# Patient Record
Sex: FEMALE | Race: OTHER | HISPANIC OR LATINO | Employment: STUDENT | URBAN - METROPOLITAN AREA
[De-identification: names, ages, dates, MRNs, and addresses within clinical notes are randomized per-mention and may not be internally consistent; named-entity substitution may affect disease eponyms.]

---

## 2024-06-21 ENCOUNTER — PATIENT ROUNDING (BHMG ONLY) (OUTPATIENT)
Dept: FAMILY MEDICINE CLINIC | Facility: CLINIC | Age: 19
End: 2024-06-21
Payer: MEDICAID

## 2024-06-21 ENCOUNTER — OFFICE VISIT (OUTPATIENT)
Dept: FAMILY MEDICINE CLINIC | Facility: CLINIC | Age: 19
End: 2024-06-21
Payer: MEDICAID

## 2024-06-21 VITALS
HEIGHT: 63 IN | OXYGEN SATURATION: 98 % | RESPIRATION RATE: 20 BRPM | SYSTOLIC BLOOD PRESSURE: 124 MMHG | BODY MASS INDEX: 46.67 KG/M2 | TEMPERATURE: 97.8 F | DIASTOLIC BLOOD PRESSURE: 84 MMHG | HEART RATE: 75 BPM | WEIGHT: 263.4 LBS

## 2024-06-21 DIAGNOSIS — E11.9 NEWLY DIAGNOSED DIABETES: ICD-10-CM

## 2024-06-21 DIAGNOSIS — Z13.1 DIABETES MELLITUS SCREENING: ICD-10-CM

## 2024-06-21 DIAGNOSIS — Z11.59 NEED FOR HEPATITIS C SCREENING TEST: ICD-10-CM

## 2024-06-21 DIAGNOSIS — E66.01 CLASS 3 SEVERE OBESITY WITHOUT SERIOUS COMORBIDITY WITH BODY MASS INDEX (BMI) OF 45.0 TO 49.9 IN ADULT, UNSPECIFIED OBESITY TYPE: ICD-10-CM

## 2024-06-21 DIAGNOSIS — Z86.79 HISTORY OF HYPERTENSION: ICD-10-CM

## 2024-06-21 DIAGNOSIS — Z00.00 ANNUAL PHYSICAL EXAM: Primary | ICD-10-CM

## 2024-06-21 NOTE — PROGRESS NOTES
How did you hear about our practice/providers?  MOM CALLED  Tell me about your visit with us. What things went well?    EVERYTHING     We're always looking for ways to make our patients' experiences even better. Do you have recommendations on ways we may improve?  NO    Overall were you satisfied with your first visit to our practice?  YES VERY SATISFIED     Is there anything else I can do for you?  Would you like for me to have my  you?  NO  You may receive a survey from [employer] via mail, text or email to provide feedback about your visit.  We ask that you please take a few minutes to complete the survey and let us know how we are doing.  If for any reason you feel unable to give us the highest rating please let me know.

## 2024-06-21 NOTE — PROGRESS NOTES
Subjective   Pam Robins is a 18 y.o. female who presents for routine physical      Chief Complaint   Patient presents with    Hypertension     Wants labs check.    Establish Care     Patient was told by urgent care provider if she has hypertension I would like some labs.     Menstrual History: LMP 6/6, irregular, has amenorrhea during school season only  Pregnancy History: G0  Sexual History: never active, not planing to be active   Contraception: none  Hormone Replacement Therapy: none  Diet: fast food for lunch, home cooking for dinner.  Exercise: no  Do you feel safe? yes  Have you ever been abused? no    Mammogram: no, no family hx of breast cancer  Pap Smear: none  Bone Density: none  Colon Cancer Screening: none, no fhx of colon cacner    Immunization History   Administered Date(s) Administered    COVID-19 (PFIZER) Purple Cap Monovalent 10/28/2021, 11/17/2021    DTaP 02/18/2004, 04/26/2004, 06/29/2004, 07/27/2005, 12/27/2007    FluMist 2-49yrs 10/31/2013, 11/17/2014    Fluzone (or Fluarix & Flulaval for VFC) >6mos 11/09/2015, 12/09/2016, 09/14/2017, 01/16/2019, 11/16/2019, 10/01/2020, 10/21/2021    HPV Quadrivalent 04/14/2015, 07/29/2015    Hep A, 2 Dose 08/15/2007, 02/21/2011    Hep B, Adolescent or Pediatric 2003, 02/18/2004, 09/29/2004    Hib (PRP-OMP) 02/18/2004, 04/26/2004, 06/29/2004, 04/20/2005    Hpv9 01/18/2016    IPV 02/18/2004, 04/26/2004, 07/27/2005, 12/27/2007    Influenza LAIV (Nasal) 02/21/2011    Influenza Seasonal Injectable 12/06/2007    MMR 04/20/2005, 12/27/2007    Meningococcal B,(Bexsero) 08/25/2020, 08/30/2021    Meningococcal MCV4P (Menactra) 04/14/2015, 08/25/2020    Pneumococcal Conjugate 13-Valent (PCV13) 02/18/2004, 04/26/2004, 06/29/2004, 01/11/2005    Tdap 04/14/2015    Varicella 01/11/2005, 12/27/2007       The following portions of the patient's history were reviewed and updated as appropriate: allergies, current medications, past family history, past medical history,  past social history, past surgical history and problem list.    Past Medical History:   Diagnosis Date    Allergic 2017    Pollen    Amenorrhea        History reviewed. No pertinent surgical history.    Family History   Problem Relation Age of Onset    Thyroid disease Mother     DAVE disease Father     No Known Problems Sister     No Known Problems Brother     Heart disease Maternal Grandmother     Hyperlipidemia Maternal Grandmother     Asthma Paternal Grandmother     Cancer Paternal Grandmother         Lung Cancer    Diabetes Paternal Grandmother     Cancer Paternal Aunt         Uterine Cancer       Social History     Socioeconomic History    Marital status: Single   Tobacco Use    Smoking status: Never     Passive exposure: Never    Smokeless tobacco: Never   Vaping Use    Vaping status: Never Used   Substance and Sexual Activity    Alcohol use: Not Currently     Alcohol/week: 1.0 standard drink of alcohol     Types: 1 Drinks containing 0.5 oz of alcohol per week     Comment: It's once in a while, not every week.    Drug use: Never    Sexual activity: Never       Review of Systems   Constitutional:  Negative for activity change, appetite change, chills, fatigue, fever and unexpected weight change.   HENT:  Positive for ear pain. Negative for ear discharge, rhinorrhea and sore throat.    Respiratory:  Negative for cough and chest tightness.    Cardiovascular:  Negative for chest pain, palpitations and leg swelling.   Gastrointestinal:  Negative for abdominal pain, constipation, diarrhea, nausea and vomiting.   Endocrine: Negative for polydipsia and polyuria.   Genitourinary:  Positive for menstrual problem. Negative for dysuria and vaginal discharge.   Neurological:  Negative for dizziness, seizures, weakness, light-headedness, numbness and headaches.   Psychiatric/Behavioral:  Negative for dysphoric mood and suicidal ideas. The patient is not nervous/anxious.        Objective   Vitals:    06/21/24 1048   BP:  124/84   Pulse: 75   Resp: 20   Temp: 97.8 °F (36.6 °C)   SpO2: 98%     Body mass index is 46.66 kg/m².  Physical Exam  Constitutional:       General: She is not in acute distress.     Appearance: She is obese. She is not ill-appearing.   HENT:      Head: Normocephalic and atraumatic.      Right Ear: Tympanic membrane, ear canal and external ear normal.      Left Ear: Tympanic membrane, ear canal and external ear normal.      Nose: Nose normal.      Mouth/Throat:      Mouth: Mucous membranes are moist.      Pharynx: No oropharyngeal exudate or posterior oropharyngeal erythema.   Cardiovascular:      Rate and Rhythm: Normal rate and regular rhythm.      Pulses: Normal pulses.      Heart sounds: No murmur heard.  Pulmonary:      Effort: Pulmonary effort is normal. No respiratory distress.      Breath sounds: Normal breath sounds.   Abdominal:      General: Abdomen is protuberant.      Palpations: Abdomen is soft. There is no hepatomegaly or splenomegaly.      Tenderness: There is no abdominal tenderness.   Musculoskeletal:      Right lower leg: No edema.      Left lower leg: No edema.   Lymphadenopathy:      Cervical: No cervical adenopathy.   Skin:     Capillary Refill: Capillary refill takes less than 2 seconds.      Findings: No lesion.   Neurological:      Mental Status: She is alert.   Psychiatric:         Mood and Affect: Mood normal.         Behavior: Behavior normal.           Assessment & Plan   Diagnoses and all orders for this visit:    1. Annual physical exam (Primary)  -     Lipid Panel  -     CBC & Differential  -     Comprehensive Metabolic Panel  -     Hemoglobin A1c  -     TSH Rfx On Abnormal To Free T4  -     Hepatitis C Antibody  -     Vitamin D 25 hydroxy  -     Urinalysis With Culture If Indicated -    2. Class 3 severe obesity without serious comorbidity with body mass index (BMI) of 45.0 to 49.9 in adult, unspecified obesity type  -     Ambulatory Referral to Nutrition Services  -     Lipid  Panel  -     CBC & Differential  -     Hemoglobin A1c  -     TSH Rfx On Abnormal To Free T4    3. Need for hepatitis C screening test  -     Hepatitis C Antibody    4. Diabetes mellitus screening  -     Hemoglobin A1c    5. History of hypertension    Other orders  -     Microscopic Examination -        Annual physical  Overall patient is doing well  Check routine labs  Discussed the importance of maintaining a healthy weight and getting regular exercise.  Educated patient on the benefits of healthy diet.  Advise follow-up annually for wellness exams.    History of hypertension/overweight  Reviewed previous labs highest /90 during urgent care visit  I do not think patient has hypertension, BP today 124/84  Advised patient to lose weight and referred her to nutritionist which will help with blood pressure management.  Follow-up in 6 months.      Patient Instructions   Goal //60

## 2024-06-22 LAB
25(OH)D3+25(OH)D2 SERPL-MCNC: 15.7 NG/ML (ref 30–100)
ALBUMIN SERPL-MCNC: 4.4 G/DL (ref 4–5)
ALP SERPL-CCNC: 67 IU/L (ref 42–106)
ALT SERPL-CCNC: 37 IU/L (ref 0–32)
APPEARANCE UR: CLEAR
AST SERPL-CCNC: 24 IU/L (ref 0–40)
BACTERIA #/AREA URNS HPF: NORMAL /[HPF]
BASOPHILS # BLD AUTO: 0 X10E3/UL (ref 0–0.2)
BASOPHILS NFR BLD AUTO: 0 %
BILIRUB SERPL-MCNC: 0.3 MG/DL (ref 0–1.2)
BILIRUB UR QL STRIP: NEGATIVE
BUN SERPL-MCNC: 13 MG/DL (ref 6–20)
BUN/CREAT SERPL: 22 (ref 9–23)
CALCIUM SERPL-MCNC: 9.5 MG/DL (ref 8.7–10.2)
CASTS URNS QL MICRO: NORMAL /LPF
CHLORIDE SERPL-SCNC: 103 MMOL/L (ref 96–106)
CHOLEST SERPL-MCNC: 191 MG/DL (ref 100–199)
CO2 SERPL-SCNC: 21 MMOL/L (ref 20–29)
COLOR UR: YELLOW
CREAT SERPL-MCNC: 0.59 MG/DL (ref 0.57–1)
EGFRCR SERPLBLD CKD-EPI 2021: 132 ML/MIN/1.73
EOSINOPHIL # BLD AUTO: 0.1 X10E3/UL (ref 0–0.4)
EOSINOPHIL NFR BLD AUTO: 1 %
EPI CELLS #/AREA URNS HPF: NORMAL /HPF (ref 0–10)
ERYTHROCYTE [DISTWIDTH] IN BLOOD BY AUTOMATED COUNT: 12.2 % (ref 11.7–15.4)
GLOBULIN SER CALC-MCNC: 2.5 G/DL (ref 1.5–4.5)
GLUCOSE SERPL-MCNC: 136 MG/DL (ref 70–99)
GLUCOSE UR QL STRIP: NEGATIVE
HBA1C MFR BLD: 7.5 % (ref 4.8–5.6)
HCT VFR BLD AUTO: 38.8 % (ref 34–46.6)
HCV IGG SERPL QL IA: NON REACTIVE
HDLC SERPL-MCNC: 37 MG/DL
HGB BLD-MCNC: 12.7 G/DL (ref 11.1–15.9)
HGB UR QL STRIP: NEGATIVE
IMM GRANULOCYTES # BLD AUTO: 0 X10E3/UL (ref 0–0.1)
IMM GRANULOCYTES NFR BLD AUTO: 0 %
KETONES UR QL STRIP: NEGATIVE
LDLC SERPL CALC-MCNC: 126 MG/DL (ref 0–99)
LEUKOCYTE ESTERASE UR QL STRIP: NEGATIVE
LYMPHOCYTES # BLD AUTO: 2.3 X10E3/UL (ref 0.7–3.1)
LYMPHOCYTES NFR BLD AUTO: 33 %
MCH RBC QN AUTO: 28.7 PG (ref 26.6–33)
MCHC RBC AUTO-ENTMCNC: 32.7 G/DL (ref 31.5–35.7)
MCV RBC AUTO: 88 FL (ref 79–97)
MICRO URNS: NORMAL
MICRO URNS: NORMAL
MONOCYTES # BLD AUTO: 0.5 X10E3/UL (ref 0.1–0.9)
MONOCYTES NFR BLD AUTO: 7 %
NEUTROPHILS # BLD AUTO: 4 X10E3/UL (ref 1.4–7)
NEUTROPHILS NFR BLD AUTO: 59 %
NITRITE UR QL STRIP: NEGATIVE
PH UR STRIP: 6.5 [PH] (ref 5–7.5)
PLATELET # BLD AUTO: 286 X10E3/UL (ref 150–450)
POTASSIUM SERPL-SCNC: 4.3 MMOL/L (ref 3.5–5.2)
PROT SERPL-MCNC: 6.9 G/DL (ref 6–8.5)
PROT UR QL STRIP: NEGATIVE
RBC # BLD AUTO: 4.42 X10E6/UL (ref 3.77–5.28)
RBC #/AREA URNS HPF: NORMAL /HPF (ref 0–2)
SODIUM SERPL-SCNC: 137 MMOL/L (ref 134–144)
SP GR UR STRIP: 1.02 (ref 1–1.03)
TRIGL SERPL-MCNC: 155 MG/DL (ref 0–149)
TSH SERPL DL<=0.005 MIU/L-ACNC: 2.92 UIU/ML (ref 0.45–4.5)
URINALYSIS REFLEX: NORMAL
UROBILINOGEN UR STRIP-MCNC: 0.2 MG/DL (ref 0.2–1)
VLDLC SERPL CALC-MCNC: 28 MG/DL (ref 5–40)
WBC # BLD AUTO: 7 X10E3/UL (ref 3.4–10.8)
WBC #/AREA URNS HPF: NORMAL /HPF (ref 0–5)

## 2024-06-26 ENCOUNTER — PATIENT ROUNDING (BHMG ONLY) (OUTPATIENT)
Dept: FAMILY MEDICINE CLINIC | Facility: CLINIC | Age: 19
End: 2024-06-26
Payer: MEDICAID

## 2024-06-26 NOTE — PROGRESS NOTES
Sent Patient following in [employer] Message:  My name is Antwon Raza      I am the Practice Manager with   [facility] [place]  [address]  [facility] [address] [phone].    And    [facility] [address],  [place], [place] [id number] [phone]    I am messaging to officially welcome you to our practice and ask about your recent visit.     How did you hear about our practice/providers?    Tell me about your visit with us. What things went well?         We're always looking for ways to make our patients' experiences even better. Do you have recommendations on ways we may improve?       Overall were you satisfied with your first visit to our practice?        Is there anything else I can do for you?     You may receive a survey from [employer] via text or email to provide feedback about your visit.  We ask that you please take a few minutes to complete the survey and let us know how we are doing.  If for any reason you feel unable to give us the highest rating please let me know.      Thank you, and have a great day.

## 2024-07-01 LAB
GAD65 AB SER IA-ACNC: <5 U/ML (ref 0–5)
PANC ISLET CELL AB TITR SER: NEGATIVE {TITER}
WRITTEN AUTHORIZATION: NORMAL

## 2024-07-08 ENCOUNTER — HOSPITAL ENCOUNTER (OUTPATIENT)
Dept: DIABETES SERVICES | Facility: HOSPITAL | Age: 19
Discharge: HOME | End: 2024-07-08
Admitting: FAMILY MEDICINE
Payer: MEDICAID

## 2024-07-08 PROCEDURE — 97802 MEDICAL NUTRITION INDIV IN: CPT

## 2024-07-08 NOTE — CONSULTS
Pam met with ANGÉLICA LOGAN for Diabetes Education.  A comprehensive assessment/training record has been sent to medical records (see media tab) to associate with this encounter.     A1c discussed. ADA goals reviewed. Thinks her grandma on dad side had diabetes. Was prescribed ozempic- awaiting insurance.     Student at [employer]. Encouraged to look up nutrition information on the dining services website or utilize the on campus dietitian. Was going to gym 2-3 times a week. Encouraged 150 minutes per week.     Consistent carbohydrate diet/carbohydrate counting discussed. Encouraged balancing carbs with lean proteins/limiting saturated fats. Encouraged limiting sugar sweetened beverages. Labels, portions, meal planning reviewed.      Pam has been encouraged to call our office with questions or additional education needs. Please place referral for additional services or follow-up should need arise.     Thank you for the referral.

## 2024-07-09 ENCOUNTER — TELEPHONE (OUTPATIENT)
Dept: FAMILY MEDICINE CLINIC | Facility: CLINIC | Age: 19
End: 2024-07-09
Payer: MEDICAID

## 2024-07-09 NOTE — TELEPHONE ENCOUNTER
OZEMPIC PA APPROVED  The request has been approved. The authorization is effective from 07/08/2024 to 01/08/2025, as long as the member is enrolled in their current health plan. The request was approved as submitted. A written notification letter will follow with additional details.. Authorization Expiration Date: January 8, 2025.  Patient and pharmacy notified.

## 2024-07-10 ENCOUNTER — CLINICAL SUPPORT (OUTPATIENT)
Dept: FAMILY MEDICINE CLINIC | Facility: CLINIC | Age: 19
End: 2024-07-10
Payer: MEDICAID

## 2024-09-06 ENCOUNTER — TELEPHONE (OUTPATIENT)
Dept: FAMILY MEDICINE CLINIC | Facility: CLINIC | Age: 19
End: 2024-09-06
Payer: MEDICAID

## 2024-09-06 DIAGNOSIS — E11.65 TYPE 2 DIABETES MELLITUS WITH HYPERGLYCEMIA, WITHOUT LONG-TERM CURRENT USE OF INSULIN: ICD-10-CM

## 2024-09-06 NOTE — TELEPHONE ENCOUNTER
Caller: Pam Robins    Relationship: Self    Best call back number: [phone]     What medication are you requesting: Semaglutide, 1 MG/DOSE, (OZEMPIC) 4 MG/3ML solution pen-injector     If a prescription is needed, what is your preferred pharmacy and phone number: [facility] [address] [place] [address] [address] [facility] - [phone]  - [phone]     Additional notes: PATIENT STATES THAT SHE NEEDS TO CHANGE PHARMACIES. REQUESTS NEW PRESCRIPTION TO GO TO [facility]

## 2024-10-07 ENCOUNTER — OFFICE VISIT (OUTPATIENT)
Dept: FAMILY MEDICINE CLINIC | Facility: CLINIC | Age: 19
End: 2024-10-07
Payer: MEDICAID

## 2024-10-07 VITALS
BODY MASS INDEX: 45.64 KG/M2 | WEIGHT: 257.6 LBS | HEART RATE: 63 BPM | RESPIRATION RATE: 16 BRPM | SYSTOLIC BLOOD PRESSURE: 114 MMHG | DIASTOLIC BLOOD PRESSURE: 82 MMHG | TEMPERATURE: 98.2 F | OXYGEN SATURATION: 98 % | HEIGHT: 63 IN

## 2024-10-07 DIAGNOSIS — Z23 IMMUNIZATION DUE: ICD-10-CM

## 2024-10-07 DIAGNOSIS — E11.65 TYPE 2 DIABETES MELLITUS WITH HYPERGLYCEMIA, WITHOUT LONG-TERM CURRENT USE OF INSULIN: Primary | ICD-10-CM

## 2024-10-07 DIAGNOSIS — E55.9 VITAMIN D DEFICIENCY: ICD-10-CM

## 2024-10-07 LAB
25(OH)D3+25(OH)D2 SERPL-MCNC: 30.9 NG/ML (ref 30–100)
ALBUMIN SERPL-MCNC: 4.5 G/DL (ref 3.5–5.2)
ALBUMIN/GLOB SERPL: 1.9 G/DL
ALP SERPL-CCNC: 67 U/L (ref 39–117)
ALT SERPL-CCNC: 32 U/L (ref 1–33)
AST SERPL-CCNC: 21 U/L (ref 1–32)
BILIRUB SERPL-MCNC: 0.3 MG/DL (ref 0–1.2)
BUN SERPL-MCNC: 15 MG/DL (ref 6–20)
BUN/CREAT SERPL: 22.4 (ref 7–25)
CALCIUM SERPL-MCNC: 9.5 MG/DL (ref 8.6–10.5)
CHLORIDE SERPL-SCNC: 104 MMOL/L (ref 98–107)
CO2 SERPL-SCNC: 25.5 MMOL/L (ref 22–29)
CREAT SERPL-MCNC: 0.67 MG/DL (ref 0.57–1)
EGFRCR SERPLBLD CKD-EPI 2021: 128.5 ML/MIN/1.73
GLOBULIN SER CALC-MCNC: 2.4 GM/DL
GLUCOSE SERPL-MCNC: 84 MG/DL (ref 65–99)
HBA1C MFR BLD: 5.7 % (ref 4.8–5.6)
POTASSIUM SERPL-SCNC: 4.3 MMOL/L (ref 3.5–5.2)
PROT SERPL-MCNC: 6.9 G/DL (ref 6–8.5)
SODIUM SERPL-SCNC: 140 MMOL/L (ref 136–145)

## 2024-10-07 PROCEDURE — 90472 IMMUNIZATION ADMIN EACH ADD: CPT | Performed by: FAMILY MEDICINE

## 2024-10-07 PROCEDURE — 90471 IMMUNIZATION ADMIN: CPT | Performed by: FAMILY MEDICINE

## 2024-10-07 PROCEDURE — 90656 IIV3 VACC NO PRSV 0.5 ML IM: CPT | Performed by: FAMILY MEDICINE

## 2024-10-07 PROCEDURE — 90677 PCV20 VACCINE IM: CPT | Performed by: FAMILY MEDICINE

## 2024-10-07 PROCEDURE — 99214 OFFICE O/P EST MOD 30 MIN: CPT | Performed by: FAMILY MEDICINE

## 2024-10-07 PROCEDURE — 3051F HG A1C>EQUAL 7.0%<8.0%: CPT | Performed by: FAMILY MEDICINE

## 2024-10-07 NOTE — PROGRESS NOTES
Chief Complaint  Follow-up and Diabetes    Subjective         aPm Robins presents to [facility]  History of Present Illness  18-year-old female presents today for routine follow-up on diabetes.  and vitamin D deficiency    Labs 6/21/2024, A1c 7.5, BREANN 605, + antibody negative.      Patient's was started on Ozempic, currently on 1 mg, patient denies side effects, she denies abdominal pain, N/C/D, diarrhea or constipation.  Patient denies hypoglycemia symptoms.  Need repeat labs    Patient is on vitamin D weekly 50,000 units, needs repeat labs.    Objective     Review of Systems     Past Medical History:   Diagnosis Date    Allergic 2017    Pollen    Amenorrhea     Diabetes mellitus June, 2024        Current Outpatient Medications:     Allergy Relief Cetirizine 10 MG tablet, Take 1 tablet by mouth Daily., Disp: , Rfl:     diphenhydrAMINE (BENADRYL) 25 mg capsule, TAKE ONE TABLET BY MOUTH EVERY 4 TO 6 HOURS AS NEEDED, Disp: , Rfl:     EPINEPHrine (EPIPEN) 0.3 MG/0.3ML solution auto-injector injection, use as directed in case of allergic reaction, Disp: , Rfl:     fluticasone (FLONASE) 50 MCG/ACT nasal spray, 2 sprays into the nostril(s) as directed by provider Daily., Disp: 18.2 mL, Rfl: 0    montelukast (SINGULAIR) 10 MG tablet, Take 1 tablet by mouth every night at bedtime., Disp: , Rfl:     vitamin D (ERGOCALCIFEROL) 1.25 MG (16635 UT) capsule capsule, Take 1 capsule by mouth 1 (One) Time Per Week., Disp: 12 capsule, Rfl: 0    predniSONE (DELTASONE) 20 MG tablet, Take 3 tablets po every morning (Patient not taking: Reported on 6/21/2024), Disp: 15 tablet, Rfl: 0   Social History     Socioeconomic History    Marital status: Single   Tobacco Use    Smoking status: Never     Passive exposure: Never    Smokeless tobacco: Never   Vaping Use    Vaping status: Never Used   Substance and Sexual Activity    Alcohol use: Not Currently     Alcohol/week: 1.0 standard drink of alcohol     Types:  "1 Drinks containing 0.5 oz of alcohol per week     Comment: It's once in a while, not every week.    Drug use: Never    Sexual activity: Never      Vital Signs:   /82 (BP Location: Right arm, Patient Position: Sitting)   Pulse 63   Temp 98.2 °F (36.8 °C)   Resp 16   Ht 160 cm (63\")   Wt 117 kg (257 lb 9.6 oz)   SpO2 98%   BMI 45.63 kg/m²       Physical Exam  Constitutional:       General: She is not in acute distress.     Appearance: She is obese. She is not ill-appearing.   Cardiovascular:      Rate and Rhythm: Normal rate and regular rhythm.      Pulses: Normal pulses.      Heart sounds: No murmur heard.  Pulmonary:      Effort: Pulmonary effort is normal.      Breath sounds: Normal breath sounds.   Abdominal:      Palpations: Abdomen is soft.      Tenderness: There is no abdominal tenderness. There is no guarding.   Neurological:      Mental Status: She is alert.          Result Review :                 Assessment and Plan    Diagnoses and all orders for this visit:    1. Type 2 diabetes mellitus with hyperglycemia, without long-term current use of insulin (Primary)  -     Hemoglobin A1c  -     Comprehensive metabolic panel  -     Microalbumin / Creatinine Urine Ratio - Urine, Clean Catch    2. Vitamin D deficiency  -     Vitamin D,25-Hydroxy    3. Immunization due  -     Fluzone >6mos (3951-3934)  -     Pneumococcal Conjugate Vaccine 20-Valent (PCV20)      Recheck A1c, CMP  Check urine microalbumin  Check vitamin D  Continue Ozempic, will decide dose after labs today  Flu vaccine and pneumonia vaccine  Follow-up in 6 months      Follow Up   Return in about 6 months (around 4/7/2025).  Patient was given instructions and counseling regarding her condition or for health maintenance advice. Please see specific information pulled into the AVS if appropriate.   "

## 2024-10-08 LAB
ALBUMIN/CREAT UR: 8 MG/G CREAT (ref 0–29)
CREAT UR-MCNC: 273.2 MG/DL
MICROALBUMIN UR-MCNC: 23.2 UG/ML

## 2024-10-08 RX ORDER — SEMAGLUTIDE 2.68 MG/ML
2 INJECTION, SOLUTION SUBCUTANEOUS WEEKLY
Qty: 9 ML | Refills: 1 | Status: SHIPPED | OUTPATIENT
Start: 2024-10-08

## 2024-12-23 ENCOUNTER — OFFICE VISIT (OUTPATIENT)
Dept: FAMILY MEDICINE CLINIC | Facility: CLINIC | Age: 19
End: 2024-12-23
Payer: MEDICAID

## 2024-12-23 VITALS
RESPIRATION RATE: 16 BRPM | SYSTOLIC BLOOD PRESSURE: 120 MMHG | TEMPERATURE: 98.2 F | OXYGEN SATURATION: 98 % | DIASTOLIC BLOOD PRESSURE: 78 MMHG | HEART RATE: 78 BPM | WEIGHT: 244 LBS | HEIGHT: 63 IN | BODY MASS INDEX: 43.23 KG/M2

## 2024-12-23 DIAGNOSIS — E11.65 TYPE 2 DIABETES MELLITUS WITH HYPERGLYCEMIA, WITHOUT LONG-TERM CURRENT USE OF INSULIN: Primary | ICD-10-CM

## 2024-12-23 PROCEDURE — 3044F HG A1C LEVEL LT 7.0%: CPT | Performed by: FAMILY MEDICINE

## 2024-12-23 PROCEDURE — 99213 OFFICE O/P EST LOW 20 MIN: CPT | Performed by: FAMILY MEDICINE

## 2024-12-23 NOTE — PROGRESS NOTES
Chief Complaint  Follow-up    Subjective         Pam Robins presents to [facility]  History of Present Illness    19-year-old female presents today for routine type 2 diabetes follow-up    Patient is currently on Ozempic 2 mg weekly.  Patient states she is compliant with medication and diet  Patient tolerating Ozempic well.  A1c on October 7, 2024 was 5.7  Patient denies hypoglycemia symptoms  Patient checks morning sugar and usually 90s      Patient has noticed 2 itchy spot on her right forearm for a few days      Objective     Review of Systems     Past Medical History:   Diagnosis Date    Allergic 2017    Pollen    Amenorrhea     Diabetes mellitus June, 2024        Current Outpatient Medications:     cetirizine-pseudoephedrine (ZyrTEC-D) 5-120 MG per 12 hr tablet, Take 1 tablet by mouth 2 (Two) Times a Day for 14 days., Disp: 28 tablet, Rfl: 0    diphenhydrAMINE (BENADRYL) 25 mg capsule, TAKE ONE TABLET BY MOUTH EVERY 4 TO 6 HOURS AS NEEDED, Disp: , Rfl:     EPINEPHrine (EPIPEN) 0.3 MG/0.3ML solution auto-injector injection, use as directed in case of allergic reaction, Disp: , Rfl:     fluticasone (FLONASE) 50 MCG/ACT nasal spray, 2 sprays into the nostril(s) as directed by provider Daily., Disp: 18.2 mL, Rfl: 0    montelukast (SINGULAIR) 10 MG tablet, Take 1 tablet by mouth every night at bedtime., Disp: , Rfl:     Semaglutide, 2 MG/DOSE, (Ozempic, 2 MG/DOSE,) 8 MG/3ML solution pen-injector, Inject 2 mg under the skin into the appropriate area as directed 1 (One) Time Per Week., Disp: 9 mL, Rfl: 1   Social History     Socioeconomic History    Marital status: Single   Tobacco Use    Smoking status: Never     Passive exposure: Never    Smokeless tobacco: Never   Vaping Use    Vaping status: Never Used   Substance and Sexual Activity    Alcohol use: Not Currently     Alcohol/week: 1.0 standard drink of alcohol     Types: 1 Drinks containing 0.5 oz of alcohol per week     Comment:  "It's once in a while, not every week.    Drug use: Never    Sexual activity: Never      Vital Signs:   /78   Pulse 78   Temp 98.2 °F (36.8 °C)   Resp 16   Ht 160 cm (63\")   Wt 111 kg (244 lb)   SpO2 98%   BMI 43.22 kg/m²       Physical Exam  Cardiovascular:      Rate and Rhythm: Normal rate.   Pulmonary:      Effort: Pulmonary effort is normal. No respiratory distress.   Neurological:      Mental Status: She is alert.        Diabetic foot exam:   Left: Filament test present   Pulses Dorsalis Pedis:  present   Reflexes 2+    Proprioception normal   Sharp/dull discrimination normal       Right: Filament test present   Pulses Dorsalis Pedis:  present   Reflexes 2+    Proprioception normal   Sharp/dull discrimination normal     Result Review :                  Latest Reference Range & Units 06/21/24 11:54 10/07/24 08:43   Hemoglobin A1C 4.80 - 5.60 % 7.5 (H) 5.70 (H)   (H): Data is abnormally high  Assessment and Plan    Diagnoses and all orders for this visit:    1. Type 2 diabetes mellitus with hyperglycemia, without long-term current use of insulin (Primary)    Patient is tolerating Ozempic well  Continue Ozempic 2 mg  Advised patient to exercise and lose more weight and avoid carbohydrates and fatty foods.  Patient states that she has appointment with ophthalmology in January for eye exam  Follow-up in 6 months for annual physical      Follow Up   Return in about 6 months (around 6/23/2025) for Annual physical.  Patient was given instructions and counseling regarding her condition or for health maintenance advice. Please see specific information pulled into the AVS if appropriate.   "

## 2025-05-04 DIAGNOSIS — E11.65 TYPE 2 DIABETES MELLITUS WITH HYPERGLYCEMIA, WITHOUT LONG-TERM CURRENT USE OF INSULIN: ICD-10-CM

## 2025-05-06 RX ORDER — SEMAGLUTIDE 2.68 MG/ML
2 INJECTION, SOLUTION SUBCUTANEOUS WEEKLY
Qty: 9 ML | Refills: 1 | Status: SHIPPED | OUTPATIENT
Start: 2025-05-06

## 2025-05-06 NOTE — TELEPHONE ENCOUNTER
Rx Refill Note  Requested Prescriptions     Pending Prescriptions Disp Refills    Ozempic, 2 MG/DOSE, 8 MG/3ML solution pen-injector [Pharmacy Med Name: OZEMPIC 2MG PER DOSE (8MG/3ML) PFP] 9 mL 1     Sig: INJECT 2MG UNDER THE SKIN ONCE WEEKLY      Last office visit with prescribing clinician: 12/23/2024   Last telemedicine visit with prescribing clinician: Visit date not found   Next office visit with prescribing clinician: 7/21/2025   Please advise this refill

## 2025-05-06 NOTE — TELEPHONE ENCOUNTER
Caller: Pam Robins    Relationship: Self    Best call back number: [phone]    Who are you requesting to speak with (clinical staff, provider,  specific staff member): CLINICAL    What was the call regarding: CHECKING STATUS OF MEDICATION REFILL REQUEST  PLEASE ADVISE

## 2025-07-18 ENCOUNTER — OFFICE VISIT (OUTPATIENT)
Dept: FAMILY MEDICINE CLINIC | Facility: CLINIC | Age: 20
End: 2025-07-18
Payer: MEDICAID

## 2025-07-18 ENCOUNTER — OFFICE VISIT (OUTPATIENT)
Dept: OBSTETRICS AND GYNECOLOGY | Age: 20
End: 2025-07-18
Payer: MEDICAID

## 2025-07-18 ENCOUNTER — PATIENT ROUNDING (BHMG ONLY) (OUTPATIENT)
Dept: OBSTETRICS AND GYNECOLOGY | Age: 20
End: 2025-07-18
Payer: MEDICAID

## 2025-07-18 VITALS
SYSTOLIC BLOOD PRESSURE: 124 MMHG | HEIGHT: 63 IN | DIASTOLIC BLOOD PRESSURE: 90 MMHG | BODY MASS INDEX: 42.28 KG/M2 | WEIGHT: 238.6 LBS

## 2025-07-18 VITALS
DIASTOLIC BLOOD PRESSURE: 72 MMHG | HEART RATE: 64 BPM | OXYGEN SATURATION: 99 % | WEIGHT: 238.6 LBS | HEIGHT: 63 IN | SYSTOLIC BLOOD PRESSURE: 104 MMHG | BODY MASS INDEX: 42.28 KG/M2 | TEMPERATURE: 98.6 F

## 2025-07-18 DIAGNOSIS — J06.9 VIRAL UPPER RESPIRATORY TRACT INFECTION: ICD-10-CM

## 2025-07-18 DIAGNOSIS — Z00.00 ROUTINE HEALTH MAINTENANCE: Primary | ICD-10-CM

## 2025-07-18 DIAGNOSIS — Z01.419 WELL WOMAN EXAM WITH ROUTINE GYNECOLOGICAL EXAM: Primary | ICD-10-CM

## 2025-07-18 DIAGNOSIS — E11.65 TYPE 2 DIABETES MELLITUS WITH HYPERGLYCEMIA, WITHOUT LONG-TERM CURRENT USE OF INSULIN: ICD-10-CM

## 2025-07-18 DIAGNOSIS — Z11.3 SCREEN FOR STD (SEXUALLY TRANSMITTED DISEASE): ICD-10-CM

## 2025-07-18 DIAGNOSIS — Z12.4 SCREENING FOR CERVICAL CANCER: ICD-10-CM

## 2025-07-18 LAB
EXPIRATION DATE: NORMAL
HBA1C MFR BLD: 5.1 % (ref 4.5–5.7)
Lab: NORMAL

## 2025-07-18 NOTE — PROGRESS NOTES
"Chief Complaint  Establish Care (Offboarding dr lindsay ) and Diabetes    Subjective    {Problem List  Visit Diagnosis   Encounters  Notes  Medications  Labs  Result Review Imaging  Media :23}    Pam Robins presents to [facility]  History of Present Illness  History of Present Illness         Objective   Vital Signs:  /72   Pulse 64   Temp 98.6 °F (37 °C)   Ht 160 cm (62.99\")   Wt 108 kg (238 lb 9.6 oz)   SpO2 99%   BMI 42.28 kg/m²   Estimated body mass index is 42.28 kg/m² as calculated from the following:    Height as of this encounter: 160 cm (62.99\").    Weight as of this encounter: 108 kg (238 lb 9.6 oz).               Physical Exam   Physical Exam         Result Review :{Labs  Result Review  Imaging  Med Tab  Media  Procedures :23}    {The following data was reviewed by (Optional):21244}  {Ambulatory Labs (Optional):74719}  {Data reviewed (Optional):99309:::1}  Results                Assessment and Plan {CC Problem List  Visit Diagnosis   ROS  Review (Popup)  Health Maintenance  Quality  BestPractice  Medications  SmartSets  SnapShot Encounters  Media :23}    Diagnoses and all orders for this visit:    1. Type 2 diabetes mellitus with hyperglycemia, without long-term current use of insulin (Primary)  -     POC Glycosylated Hemoglobin (Hb A1C)      Assessment & Plan            {Time Spent (Optional):82415}  Follow Up {Instructions Charge Capture  Follow-up Communications :23}    No follow-ups on file.  Patient was given instructions and counseling regarding her condition or for health maintenance advice. Please see specific information pulled into the AVS if appropriate.    Patient or patient representative verbalized consent for the use of Ambient Listening during the visit with  Meredith Lea Kehrer, MD for chart documentation. 7/18/2025  10:36 EDT      "

## 2025-07-18 NOTE — PROGRESS NOTES
Subjective   Pam Robins is a 19 y.o. female who presents for annual female wellness exam.  Chief Complaint   Patient presents with    [facility]     Offboarding dr lindsay     Diabetes        History of Present Illness  The patient is a 19-year-old female who presents to establish care and follow up on her type 2 diabetes.    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 She has been unable to refill her Ozempic prescription since the last week of May 2025 due to issues with transferring the prescription to her current location. She believes her weight has remained stable since discontinuing Ozempic. She has made significant dietary changes over the past year, including reducing her sugar intake and limiting her consumption of carbohydrates. She occasionally drinks soda but primarily consumes water. She maintains a regular exercise routine at the gym. She reports no chest or abdominal pain.    She is here for her annual checkup and wishes to have her blood work done. She recently experienced a sensation of clogged ears and developed sores in her mouth, leading her to suspect a possible cold. Earlier in the week, she had a runny nose, which she attributes to her allergies.    She recently had her first Pap smear with her gynecologist. She has regular menstrual cycles and is sexually active with one male partner. She has never been pregnant and does not use birth control, but uses condoms for protection. She has not discussed birth control options with her gynecologist. She feels safe in general and reports no history of abuse. She is up to date with her eye doctor appointments but has not seen a dentist recently.    Education Level: Studying global business management at [employer]  Occupations: Internship in [place]  Diet: Reduced sugar intake, limits carbohydrates, primarily drinks water, occasionally drinks soda  Sexual Practices: Sexually active with one male  partner    GYNECOLOGICAL HISTORY:  Frequency and Flow: Regular       Menstrual History: regular  Pregnancy History: G0  Sexual History: yes, 1 male partner   Contraception: condoms  Hormone Replacement Therapy: na  Diet: standard, trying to limit sweets  Exercise: some, going to the gym  Do you feel safe? yes  Have you ever been abused? no  Last dental exam: due  Last eye exam: up to date    Mammogram: na  Pap Smear: done  Bone Density: na  Colon Cancer Screening: na    Immunization History   Administered Date(s) Administered    COVID-19 (PFIZER) Purple Cap Monovalent 10/28/2021, 11/17/2021    DTaP 02/18/2004, 04/26/2004, 06/29/2004, 07/27/2005, 12/27/2007    FluMist 2-49yrs 10/31/2013, 11/17/2014    FluMist 2-49yrs (Nasal) 02/21/2011    Fluzone  >6mos 10/07/2024    Fluzone (or Fluarix & Flulaval for VFC) >6mos 11/09/2015, 12/09/2016, 09/14/2017, 01/16/2019, 11/16/2019, 10/01/2020, 10/21/2021    HPV Quadrivalent 04/14/2015, 07/29/2015    Hep A, 2 Dose 08/15/2007, 02/21/2011    Hep B, Adolescent or Pediatric 2003, 02/18/2004, 09/29/2004    Hib (PRP-OMP) 02/18/2004, 04/26/2004, 06/29/2004, 04/20/2005    Hpv9 01/18/2016    IPV 02/18/2004, 04/26/2004, 07/27/2005, 12/27/2007    Influenza Seasonal Injectable 12/06/2007    MMR 04/20/2005, 12/27/2007    Meningococcal B,(Bexsero) 08/25/2020, 08/30/2021    Meningococcal MCV4P (Menactra) 04/14/2015, 08/25/2020    Pneumococcal Conjugate 13-Valent (PCV13) 02/18/2004, 04/26/2004, 06/29/2004, 01/11/2005    Pneumococcal Conjugate 20-Valent (PCV20) 10/07/2024    Tdap 04/14/2015    Varicella 01/11/2005, 12/27/2007       The following portions of the patient's history were reviewed and updated as appropriate: allergies, current medications, past family history, past medical history, past social history, past surgical history and problem list.    Past Medical History:   Diagnosis Date    Allergic 2017    Pollen    Diabetes mellitus June, 2024    Obesity        Past Surgical  "History:   Procedure Laterality Date    NO PAST SURGERIES         Family History   Problem Relation Age of Onset    DAVE disease Father     Thyroid disease Mother     Miscarriages / Stillbirths Mother     No Known Problems Brother     No Known Problems Sister     Asthma Paternal Grandmother     Cancer Paternal Grandmother         Lung Cancer    Diabetes Paternal Grandmother     Uterine cancer Paternal Grandmother     Heart disease Maternal Grandmother         had a bypass surgery    Hyperlipidemia Maternal Grandmother     Hypertension Maternal Grandmother     Cancer Paternal Aunt         Uterine Cancer    Breast cancer Neg Hx     Ovarian cancer Neg Hx     Colon cancer Neg Hx        Social History     Socioeconomic History    Marital status: Single   Tobacco Use    Smoking status: Never     Passive exposure: Never    Smokeless tobacco: Never   Vaping Use    Vaping status: Never Used   Substance and Sexual Activity    Alcohol use: Yes     Alcohol/week: 3.0 standard drinks of alcohol     Types: 1 Shots of liquor, 2 Drinks containing 0.5 oz of alcohol per week     Comment: It's once in a while, not every week.    Drug use: Never    Sexual activity: Yes     Partners: Male     Birth control/protection: Condom, None         Current Outpatient Medications:     montelukast (SINGULAIR) 10 MG tablet, Take 1 tablet by mouth every night at bedtime., Disp: , Rfl:     Review of Systems    Objective   Vitals:    07/18/25 1009   BP: 104/72   Pulse: 64   Temp: 98.6 °F (37 °C)   SpO2: 99%   Weight: 108 kg (238 lb 9.6 oz)   Height: 160 cm (62.99\")     Body mass index is 42.28 kg/m².  Physical Exam  Vitals and nursing note reviewed.   Constitutional:       General: She is not in acute distress.     Appearance: Normal appearance. She is well-developed.   HENT:      Head: Normocephalic and atraumatic.      Right Ear: Tympanic membrane, ear canal and external ear normal.      Left Ear: Tympanic membrane, ear canal and external ear normal. "      Nose: Nose normal.      Mouth/Throat:      Mouth: Mucous membranes are moist.      Pharynx: Oropharynx is clear. No oropharyngeal exudate or posterior oropharyngeal erythema.   Eyes:      Conjunctiva/sclera: Conjunctivae normal.      Pupils: Pupils are equal, round, and reactive to light.   Neck:      Thyroid: No thyromegaly.   Cardiovascular:      Rate and Rhythm: Normal rate and regular rhythm.      Heart sounds: No murmur heard.  Pulmonary:      Effort: Pulmonary effort is normal.      Breath sounds: Normal breath sounds. No wheezing.   Abdominal:      General: Abdomen is flat. Bowel sounds are normal. There is no distension.      Palpations: Abdomen is soft. There is no mass.      Tenderness: There is no abdominal tenderness.      Hernia: No hernia is present.   Musculoskeletal:         General: No swelling. Normal range of motion.      Cervical back: Normal range of motion and neck supple.      Right lower leg: No edema.      Left lower leg: No edema.   Lymphadenopathy:      Cervical: No cervical adenopathy.   Skin:     General: Skin is warm and dry.      Capillary Refill: Capillary refill takes less than 2 seconds.      Findings: No rash.   Neurological:      General: No focal deficit present.      Mental Status: She is alert and oriented to person, place, and time.      Cranial Nerves: No cranial nerve deficit.   Psychiatric:         Mood and Affect: Mood normal.         Behavior: Behavior normal.       Physical Exam  Mouth/Throat: Oral exam performed.  Other: Blood pressure is normal today.       Results  Labs   - A1c: 5.1       Assessment & Plan   Diagnoses and all orders for this visit:    1. Routine health maintenance (Primary)  -     TSH  -     Lipid Panel  -     CBC & Differential  -     Comprehensive Metabolic Panel    2. Type 2 diabetes mellitus with hyperglycemia, without long-term current use of insulin  -     POC Glycosylated Hemoglobin (Hb A1C)  -     TSH  -     Lipid Panel  -     CBC &  Differential  -     Comprehensive Metabolic Panel      Assessment & Plan  1. Type 2 diabetes mellitus.  - A1c level has improved from 7.5 a year ago to 5.1, likely due to dietary modifications.  - Lost over 20 pounds since November 2024; weight stable since discontinuing Ozempic.  - Comprehensive panel of labs ordered today, including liver enzymes and cholesterol levels.  - A1c will be rechecked in 6 months while off Ozempic; consideration for resuming injectable medication if A1c increases.    2. Health maintenance.  - Advised to schedule a dental appointment.  - Encouraged to discuss birth control options with gynecologist.  - Advised to ensure she is not at high risk for STDs by confirming partner's sexual exclusivity.  - Blood pressure is excellent.    3. Suspected viral upper respiratory infection.  - Reports symptoms of clogged ears and mouth sores; had a runny nose earlier in the week.  - No sore throat or cough present.  - Advised to monitor symptoms and seek further evaluation if they worsen.    Follow-up  The patient will follow up in 6 months.            Discussed the importance of maintaining a healthy weight and getting regular exercise.  Educated patient on the benefits of healthy diet.  Advise follow-up annually for wellness exams.    There are no Patient Instructions on file for this visit.    Patient or patient representative verbalized consent for the use of Ambient Listening during the visit with  Meredith Lea Kehrer, MD for chart documentation. 7/18/2025  10:38 EDT

## 2025-07-18 NOTE — PROGRESS NOTES
[facility]   Obstetrics and Gynecology   Routine Annual Visit    2025    Patient: Pam Robins          MR#:[id number]    History of Present Illness    Chief Complaint   Patient presents with    Gynecologic Exam     CC: new gyn annual.        19 y.o. female  who presents for annual exam.  Reports regular monthly menses without issue.  No contraception and declines it.  Uses condoms inconsistently.    S/p gardasil  Never had pap and would like to complete today    H/o DM, was taking Ozempic but needs new prescription, seeing new PCP Dr. Kehrer after this appt    Obstetric History:  OB History          0    Para   0    Term   0       0    AB   0    Living   0         SAB   0    IAB   0    Ectopic   0    Molar   0    Multiple   0    Live Births   0               Menstrual History:     Patient's last menstrual period was 2025 (exact date).       Sexual History:   Sexually active with men, desires STD testing      Social History:   Intern in HR in [place]    ________________________________________  There is no problem list on file for this patient.    Past Medical History:   Diagnosis Date    Allergic     Pollen    Diabetes mellitus     Obesity      Past Surgical History:   Procedure Laterality Date    NO PAST SURGERIES       Social History     Tobacco Use   Smoking Status Never    Passive exposure: Never   Smokeless Tobacco Never     Family History   Problem Relation Age of Onset    DAVE disease Father     Thyroid disease Mother     Miscarriages / Stillbirths Mother     No Known Problems Brother     No Known Problems Sister     Asthma Paternal Grandmother     Cancer Paternal Grandmother         Lung Cancer    Diabetes Paternal Grandmother     Uterine cancer Paternal Grandmother     Heart disease Maternal Grandmother         had a bypass surgery    Hyperlipidemia Maternal Grandmother     Hypertension Maternal Grandmother     Cancer Paternal Aunt          "Uterine Cancer    Breast cancer Neg Hx     Ovarian cancer Neg Hx     Colon cancer Neg Hx      Prior to Admission medications    Medication Sig Start Date End Date Taking? Authorizing Provider   montelukast (SINGULAIR) 10 MG tablet Take 1 tablet by mouth every night at bedtime. 10/19/23  Yes ProviderCarlos MD   cetirizine-pseudoephedrine (ZyrTEC-D) 5-120 MG per 12 hr tablet Take 1 tablet by mouth 2 (Two) Times a Day for 14 days. 11/3/24 12/23/24  Alejandro Miller APRN   diphenhydrAMINE (BENADRYL) 25 mg capsule TAKE ONE TABLET BY MOUTH EVERY 4 TO 6 HOURS AS NEEDED  Patient not taking: Reported on 7/18/2025 8/30/23   Carlos Richard MD   EPINEPHrine (EPIPEN) 0.3 MG/0.3ML solution auto-injector injection use as directed in case of allergic reaction  Patient not taking: Reported on 7/18/2025 8/30/23   ProviderCarlos MD   fluticasone (FLONASE) 50 MCG/ACT nasal spray 2 sprays into the nostril(s) as directed by provider Daily.  Patient not taking: Reported on 7/18/2025 11/26/23   Rozina Bernal APRN   Ozempic, 2 MG/DOSE, 8 MG/3ML solution pen-injector INJECT 2MG UNDER THE SKIN ONCE WEEKLY  Patient not taking: Reported on 7/18/2025 5/6/25   Teto Valladares MD     ________________________________________    Current contraception: condoms  History of abnormal Pap smear: no  Family history of uterine or ovarian cancer: yes - possibly an aunt but not sure  Family History of colon cancer/colon polyps: no  History of abnormal mammogram: no    The following portions of the patient's history were reviewed and updated as appropriate: allergies, current medications, past family history, past medical history, past social history, past surgical history, and problem list.    Review of Systems   All other systems reviewed and are negative.           Objective     /90   Ht 160 cm (62.99\")   Wt 108 kg (238 lb 9.6 oz)   LMP 06/20/2025 (Exact Date)   BMI 42.28 kg/m²    BP Readings from Last 3 " "Encounters:   07/18/25 124/90   12/23/24 120/78   11/03/24 131/90      Wt Readings from Last 3 Encounters:   07/18/25 108 kg (238 lb 9.6 oz)   12/23/24 111 kg (244 lb)   11/03/24 116 kg (256 lb)        BMI: Estimated body mass index is 42.28 kg/m² as calculated from the following:    Height as of this encounter: 160 cm (62.99\").    Weight as of this encounter: 108 kg (238 lb 9.6 oz).    Physical Exam  Vitals and nursing note reviewed.   Constitutional:       General: She is not in acute distress.     Appearance: Normal appearance.   HENT:      Head: Normocephalic and atraumatic.   Eyes:      Extraocular Movements: Extraocular movements intact.   Cardiovascular:      Rate and Rhythm: Normal rate and regular rhythm.      Pulses: Normal pulses.      Heart sounds: No murmur heard.  Pulmonary:      Effort: Pulmonary effort is normal. No respiratory distress.      Breath sounds: Normal breath sounds.   Chest:   Breasts:     Right: Normal. No mass, nipple discharge, skin change or tenderness.      Left: Normal. No mass, nipple discharge, skin change or tenderness.   Abdominal:      General: There is no distension.      Palpations: Abdomen is soft. There is no mass.      Tenderness: There is no abdominal tenderness.   Genitourinary:     General: Normal vulva.      Labia:         Right: No rash or lesion.         Left: No rash or lesion.       Urethra: No prolapse, urethral swelling or urethral lesion.      Vagina: Normal.      Cervix: Normal.      Uterus: Normal.       Adnexa: Right adnexa normal and left adnexa normal.      Comments: Bladder: no masses or tenderness  Perineum/Anus: no masses, lesions, or skin changes  Musculoskeletal:         General: No swelling. Normal range of motion.      Cervical back: Normal range of motion.   Lymphadenopathy:      Upper Body:      Right upper body: No axillary adenopathy.      Left upper body: No axillary adenopathy.   Skin:     General: Skin is warm and dry.   Neurological:      " General: No focal deficit present.      Mental Status: She is alert and oriented to person, place, and time.   Psychiatric:         Mood and Affect: Mood normal.         Behavior: Behavior normal.         As part of wellness and prevention, the following topics were discussed with the patient:  Encouraged self breast exam  Physical activity and regular exercised encouraged.   Injury prevention discussed.  Healthy weight discussed.  Nutrition discussed.  Substance abuse/misuse discussed.  Sexual behavior/safe practices discussed.   Sexual transmitted disease prevention   Contraception discussed.   Mental health discussed.   Vaccinations/immunizations addressed.             Assessment:  Diagnoses and all orders for this visit:    1. Well woman exam with routine gynecological exam (Primary)  -     IGP,CtNgTv,rfx Apt HPV All    2. Screening for cervical cancer  -     IGP,CtNgTv,rfx Apt HPV All    3. Screen for STD (sexually transmitted disease)  -     IGP,CtNgTv,rfx Apt HPV All      -Breast and pelvic exam normal  - Pap today  - STD screen today  - Discussed various contraceptive options.  Ultimately patient declines.    Plan:  Return in about 1 year (around 7/18/2026) for Annual.      Hanna Bradley MD  7/18/2025 09:11 EDT

## 2025-07-19 LAB
ALBUMIN SERPL-MCNC: 4.2 G/DL (ref 3.5–5.2)
ALBUMIN/GLOB SERPL: 1.4 G/DL
ALP SERPL-CCNC: 55 U/L (ref 39–117)
ALT SERPL-CCNC: 26 U/L (ref 1–33)
AST SERPL-CCNC: 20 U/L (ref 1–32)
BASOPHILS # BLD AUTO: 0.02 10*3/MM3 (ref 0–0.2)
BASOPHILS NFR BLD AUTO: 0.3 % (ref 0–1.5)
BILIRUB SERPL-MCNC: <0.2 MG/DL (ref 0–1.2)
BUN SERPL-MCNC: 10 MG/DL (ref 6–20)
BUN/CREAT SERPL: 16.4 (ref 7–25)
CALCIUM SERPL-MCNC: 9.3 MG/DL (ref 8.6–10.5)
CHLORIDE SERPL-SCNC: 106 MMOL/L (ref 98–107)
CHOLEST SERPL-MCNC: 151 MG/DL (ref 0–200)
CO2 SERPL-SCNC: 19.4 MMOL/L (ref 22–29)
CREAT SERPL-MCNC: 0.61 MG/DL (ref 0.57–1)
EGFRCR SERPLBLD CKD-EPI 2021: 130.6 ML/MIN/1.73
EOSINOPHIL # BLD AUTO: 0.07 10*3/MM3 (ref 0–0.4)
EOSINOPHIL NFR BLD AUTO: 1 % (ref 0.3–6.2)
ERYTHROCYTE [DISTWIDTH] IN BLOOD BY AUTOMATED COUNT: 13.5 % (ref 12.3–15.4)
GLOBULIN SER CALC-MCNC: 2.9 GM/DL
GLUCOSE SERPL-MCNC: 123 MG/DL (ref 65–99)
HCT VFR BLD AUTO: 37.6 % (ref 34–46.6)
HDLC SERPL-MCNC: 38 MG/DL (ref 40–60)
HGB BLD-MCNC: 12.3 G/DL (ref 12–15.9)
IMM GRANULOCYTES # BLD AUTO: 0.01 10*3/MM3 (ref 0–0.05)
IMM GRANULOCYTES NFR BLD AUTO: 0.1 % (ref 0–0.5)
LDLC SERPL CALC-MCNC: 96 MG/DL (ref 0–100)
LYMPHOCYTES # BLD AUTO: 1.66 10*3/MM3 (ref 0.7–3.1)
LYMPHOCYTES NFR BLD AUTO: 22.9 % (ref 19.6–45.3)
MCH RBC QN AUTO: 29.9 PG (ref 26.6–33)
MCHC RBC AUTO-ENTMCNC: 32.7 G/DL (ref 31.5–35.7)
MCV RBC AUTO: 91.5 FL (ref 79–97)
MONOCYTES # BLD AUTO: 0.39 10*3/MM3 (ref 0.1–0.9)
MONOCYTES NFR BLD AUTO: 5.4 % (ref 5–12)
NEUTROPHILS # BLD AUTO: 5.09 10*3/MM3 (ref 1.7–7)
NEUTROPHILS NFR BLD AUTO: 70.3 % (ref 42.7–76)
NRBC BLD AUTO-RTO: 0 /100 WBC (ref 0–0.2)
PLATELET # BLD AUTO: 276 10*3/MM3 (ref 140–450)
POTASSIUM SERPL-SCNC: 4.5 MMOL/L (ref 3.5–5.2)
PROT SERPL-MCNC: 7.1 G/DL (ref 6–8.5)
RBC # BLD AUTO: 4.11 10*6/MM3 (ref 3.77–5.28)
SODIUM SERPL-SCNC: 137 MMOL/L (ref 136–145)
TRIGL SERPL-MCNC: 92 MG/DL (ref 0–150)
TSH SERPL DL<=0.005 MIU/L-ACNC: 2.47 UIU/ML (ref 0.27–4.2)
VLDLC SERPL CALC-MCNC: 17 MG/DL (ref 5–40)
WBC # BLD AUTO: 7.24 10*3/MM3 (ref 3.4–10.8)

## 2025-07-22 LAB
C TRACH RRNA CVX QL NAA+PROBE: NEGATIVE
CONV .: NORMAL
CYTOLOGIST CVX/VAG CYTO: NORMAL
CYTOLOGY CVX/VAG DOC CYTO: NORMAL
CYTOLOGY CVX/VAG DOC THIN PREP: NORMAL
DX ICD CODE: NORMAL
N GONORRHOEA RRNA CVX QL NAA+PROBE: NEGATIVE
OTHER STN SPEC: NORMAL
SERVICE CMNT-IMP: NORMAL
STAT OF ADQ CVX/VAG CYTO-IMP: NORMAL
T VAGINALIS RRNA SPEC QL NAA+PROBE: NEGATIVE